# Patient Record
Sex: FEMALE | Race: WHITE
[De-identification: names, ages, dates, MRNs, and addresses within clinical notes are randomized per-mention and may not be internally consistent; named-entity substitution may affect disease eponyms.]

---

## 2017-01-12 ENCOUNTER — HOSPITAL ENCOUNTER (EMERGENCY)
Dept: HOSPITAL 25 - UCEAST | Age: 52
Discharge: HOME | End: 2017-01-12
Payer: COMMERCIAL

## 2017-01-12 VITALS — DIASTOLIC BLOOD PRESSURE: 73 MMHG | SYSTOLIC BLOOD PRESSURE: 133 MMHG

## 2017-01-12 DIAGNOSIS — F12.90: ICD-10-CM
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
## 2017-01-12 DIAGNOSIS — S06.0X0A: Primary | ICD-10-CM
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
## 2017-01-12 PROCEDURE — 99211 OFF/OP EST MAY X REQ PHY/QHP: CPT

## 2017-01-12 PROCEDURE — G0463 HOSPITAL OUTPT CLINIC VISIT: HCPCS

## 2017-01-12 PROCEDURE — 70450 CT HEAD/BRAIN W/O DYE: CPT

## 2017-01-12 NOTE — UC
Head Injury HPI





- HPI Summary


HPI Summary: 





52 yo female was skating 4 days ago fell and striking her occiput





No LOC





felt pretty good initially





no with mild HA, very low energy, trouble thinking clearly ()





no neck pain





no n/v/d





some disequilibrium





no hx concussion














- History Of Current Complaint


Chief Complaint: UCHeadInjury


Stated Complaint: HEAD INJURY


Time Seen by Provider: 01/12/17 15:07


Hx Obtained From: Patient


Hx Last Menstrual Period: irreg now  6 weeks ago?


Onset/Duration: Gradual Onset


Severity Currently: Mild


Severity Initially: Mild


Pain Intensity: 3


Pain Scale Used: 0-10 Numeric


Character: Dull


Aggravating Factor(s): Nothing


Alleviating Factor(s): Nothing


Associated Signs And Symptoms: Negative: LOC (Time In Secs./Mins/Hrs), LOC 

Duration Unknown, Confusion, Memory Loss, Seizure, Epistaxis, Dental 

Malocclusion, Neck Pain, Nausea





- Allergies/Home Medications


Allergies/Adverse Reactions: 


 Allergies











Allergy/AdvReac Type Severity Reaction Status Date / Time


 


Dust Allergy  Congestion Uncoded 01/12/17 15:02














PMH/Surg Hx/FS Hx/Imm Hx


Previously Healthy: Yes


Endocrine History Of: 


   Denies: Diabetes - gestational diabetes, Thyroid Disease


Cardiovascular History Of: 


   Denies: Cardiac Disorders, Hypertension


Respiratory History Of: 


   Denies: COPD, Asthma


GI/ History Of: 


   Denies: Ulcer


Cancer History Of: 


   Denies: Breast Cancer





- Surgical History


Surgical History: Yes


Surgery Procedure, Year, and Place: TUBAL LIGATION.  csection x2.  vascular 

surgery bilat legs-varicose veins





- Family History


Known Family History: Positive: Cardiac Disease - father, Hypertension - father

, Diabetes - grnad mother





- Social History


Alcohol Use: Occasionally


Substance Use Type: Marijuana


Smoking Status (MU): Never Smoked Tobacco





Review of Systems


Constitutional: Fatigue


Skin: Negative


Eyes: Negative


ENT: Negative


Respiratory: Negative


Cardiovascular: Negative


Gastrointestinal: Negative


Genitourinary: Negative


Motor: Negative


Neurovascular: Negative


Musculoskeletal: Negative


Neurological: Negative


Psychological: Negative


All Other Systems Reviewed And Are Negative: Yes





Physical Exam


Triage Information Reviewed: Yes


Appearance: Well-Appearing, No Pain Distress, Well-Nourished


Vital Signs: 


 Initial Vital Signs











Temp  98.9 F   01/12/17 14:59


 


Pulse  78   01/12/17 14:59


 


Resp  16   01/12/17 14:59


 


BP  133/73   01/12/17 14:59


 


Pulse Ox  100   01/12/17 14:59











Vital Signs Reviewed: Yes


Eyes: Positive: Conjunctiva Clear, Other: - fundi benign


ENT: Positive: Hearing grossly normal, Pharynx normal, TMs normal.  Negative: 

Pharyngeal erythema, Nasal congestion, Nasal drainage


Neck: Positive: Supple, Nontender, No Lymphadenopathy


Respiratory: Positive: Lungs clear, Normal breath sounds, No respiratory 

distress


Cardiovascular: Positive: RRR, No Murmur


Musculoskeletal: Positive: ROM Intact, No Edema


Neurological: Positive: Alert, Muscle Tone Normal, Other: - GCS15/15


Psychological Exam: Normal


Skin Exam: Normal





Head Injury Course/Dx





- Differential Dx/Diagnosis


Provider Diagnoses: concussion





Discharge





- Discharge Plan


Condition: Stable


Disposition: HOME


Patient Education Materials:  Concussion (ED), Post Concussion Syndrome (ED)


Referrals: 


Haley Osorio MD [Primary Care Provider] - 5 Days


Additional Instructions: 


rest (both mental and physical)

## 2017-01-12 NOTE — RAD
HISTORY: Head injury, trauma



COMPARISONS: July 15, 2014



TECHNIQUE: Multiple contiguous axial CT scans were obtained of the head without 

intravenous contrast. 



FINDINGS: 

HEMORRHAGE/INFARCT: There is no hemorrhage or acute infarct.

MASSES/SHIFT: There is no mass or shift.



EXTRA-AXIAL SPACES: There are no extra-axial fluid collections.

SULCI AND VENTRICLES: The sulci and ventricles are normal in size and position for the

patient's stated age.



CEREBRUM: There are no focal parenchymal abnormalities.

BRAINSTEM: There are no focal parenchymal abnormalities.

CEREBELLUM: There are no focal parenchymal abnormalities.



VESSELS: The vessels are grossly normal.

PARANASAL SINUSES: The paranasal sinuses are clear.

ORBITS: The orbits are unremarkable.

BONES AND SOFT TISSUE: No bone or soft tissue abnormalities are noted.



OTHER: None



IMPRESSION: 

NO ACUTE INTRACRANIAL PATHOLOGY.

## 2019-03-08 ENCOUNTER — HOSPITAL ENCOUNTER (EMERGENCY)
Dept: HOSPITAL 25 - UCEAST | Age: 54
Discharge: HOME | End: 2019-03-08
Payer: COMMERCIAL

## 2019-03-08 VITALS — DIASTOLIC BLOOD PRESSURE: 76 MMHG | SYSTOLIC BLOOD PRESSURE: 130 MMHG

## 2019-03-08 DIAGNOSIS — Z87.891: ICD-10-CM

## 2019-03-08 DIAGNOSIS — Z91.09: ICD-10-CM

## 2019-03-08 DIAGNOSIS — J40: Primary | ICD-10-CM

## 2019-03-08 DIAGNOSIS — R06.2: ICD-10-CM

## 2019-03-08 PROCEDURE — G0463 HOSPITAL OUTPT CLINIC VISIT: HCPCS

## 2019-03-08 PROCEDURE — 99212 OFFICE O/P EST SF 10 MIN: CPT

## 2019-03-08 NOTE — UC
General HPI





- HPI Summary


HPI Summary: 





Pleasant 55 yo female c/o several days chest congestion head congestion, feels 

wheezing in chest, stefan when lying down.  Fever?  no chills.  Symptoms getting 

worse, not better.  No sob perse, but is aware of her breathing.  No GI issues.

  Some head congestion.  








- History of Current Complaint


Chief Complaint: UCGeneralIllness


Stated Complaint: CONGESTED


Time Seen by Provider: 03/08/19 18:45


Hx Obtained From: Patient


Hx Last Menstrual Period: na


Pain Intensity: 6





- Allergy/Home Medications


Allergies/Adverse Reactions: 


 Allergies











Allergy/AdvReac Type Severity Reaction Status Date / Time


 


Dust Allergy  Congestion Uncoded 03/08/19 17:11











Home Medications: 


 Home Medications





Dextroamphetamine/Amphetamine [Adderall Xr 10 mg Capsule] 10 mg PO DAILY WITH 

MEAL 03/08/19 [History Confirmed 03/08/19]











PMH/Surg Hx/FS Hx/Imm Hx


Previously Healthy: No - see below





- Surgical History


Surgical History: Yes


Surgery Procedure, Year, and Place: TUBAL LIGATION.  csection x2.  vascular 

surgery bilat legs-varicose veins





- Family History


Known Family History: Positive: Cardiac Disease - father, Hypertension - father

, Diabetes - grnad mother





- Social History


Alcohol Use: Occasionally


Substance Use Type: Marijuana


Smoking Status (MU): Former Smoker





Review of Systems


All Other Systems Reviewed And Are Negative: Yes


Constitutional: Positive: Other - see hpi


Skin: Positive: Other - see hpi


Eyes: Positive: Other - see hpi


ENT: Positive: Other - see hpi


Respiratory: Positive: Other - see hpi


Cardiovascular: Positive: Other - see hpi


Gastrointestinal: Positive: Negative


Genitourinary: Positive: Negative


Motor: Positive: Negative


Neurovascular: Positive: Negative


Musculoskeletal: Positive: Negative


Neurological: Positive: Negative


Psychological: Positive: Negative


Is Patient Immunocompromised?: No





Physical Exam


Triage Information Reviewed: Yes


Appearance: Well-Nourished - sitting up, conversing in full sentances.  Looks 

tired but nad.


Vital Signs: 


 Initial Vital Signs











Temp  98.9 F   03/08/19 17:07


 


Pulse  83   03/08/19 17:07


 


Resp  18   03/08/19 17:07


 


BP  133/80   03/08/19 17:07


 


Pulse Ox  100   03/08/19 17:07











Vital Signs Reviewed: Yes


Eye Exam: Normal


ENT: Positive: Pharyngeal erythema - mild post pharyng redness, c/w cough, TM 

dull


Neck exam: Normal


Neck: Positive: Supple, Nontender


Respiratory Exam: Other - + scattered rhonchi, mild exp wheeze.  Adventitious 

sounds more prominent  R chest.  BS full bilat.


Respiratory: Positive: No respiratory distress, No accessory muscle use


Cardiovascular Exam: Normal


Cardiovascular: Positive: RRR, Pulses Normal, Brisk Capillary Refill


Abdominal Exam: Normal


Abdomen Description: Positive: Nontender


Musculoskeletal Exam: Normal - gait steady, moves x ext's


Neurological Exam: Normal


Psychological Exam: Normal


Skin Exam: Normal - no visible or reported





Course/Dx





- Course


Course Of Treatment: Reviewed coa / tx plan.  Questions answered as posed.  She 

will consider cxr if worse or new better.  Encourage resp check pcp this week 

if possible.





- Diagnoses


Provider Diagnosis: 


 Wheezing, Bronchitis








Discharge





- Sign-Out/Discharge


Documenting (check all that apply): Patient Departure


All imaging exams completed and their final reports reviewed: No Studies





- Discharge Plan


Condition: Stable


Disposition: HOME


Prescriptions: 


Albuterol HFA INHALER* [Ventolin HFA Inhaler*] 1 - 2 puff INH Q4H PRN #1 mdi


 PRN Reason: Wheezing


Benzonatate CAP* [Tessalon 100 MG CAP*] 100 mg PO TID #30 cap


DOXYcycline CAP(*) [DOXYcycline 100MG CAP(*)] 100 mg PO BID #20 cap


Patient Education Materials:  Acute Bronchitis (ED), Wheezing (ED)


Referrals: 


No Primary Care Phys,NOPCP [Primary Care Provider] - 


Additional Instructions: 


Call your primary care physician on Monday to schedule follow up for 1-2 weeks.

  


Seek medical attention for worse or new problems in the meantime. 





Drink plenty of fluids.  





Avoid prolonged sun exposure while taking doxycycline.  








- Billing Disposition and Condition


Condition: STABLE


Disposition: Home

## 2019-03-08 NOTE — XMS REPORT
Continuity of Care Document (CCD)

 Created on:2019



Patient:Laura Finley

Sex:Female

:1965

External Reference #:[id number]





Demographics







 Address  [address]



   [place] [address]

 

 Home Phone  [phone]

 

 Mobile Phone  [phone]

 

 Email Address  [email or web address]

 

 Preferred Language  en

 

 Marital Status  Not  or 

 

 Alevism Affiliation  Unknown

 

 Race  White

 

 Ethnic Group  Not  or 









Author







 Name  Franki Pao









Support







 Name  Relationship  Address  Phone

 

 Sung Dickson  Unavailable  [address]  [phone]



     [place] [address]  









Care Team Providers







 Name  Role  Phone

 

 Pao Vora MD  Primary Care Physician  Unavailable









Payers







 Date  Identification Numbers  Payment Provider  Subscriber

 

   Policy Number: [id number]  [employer]  Sung Dickson









 Group Number: [id number]  [id number]

 

 PayID: [id number]  [place] [address]







Advance Directives







 Description

 

 No Information Available







Problems







 Date  Description  Provider  Status

 

 Onset: 2015  Atypical chest pain  Compa Hand MD, [facility]  
Active

 

 Onset: 2018  Aortic valve disorder  Compa Hand MD, [facility]  
Active







Family History







 Date  Family Member(s)  Observation  Comments

 

   General  Diabetes Type II  Paternal grandmother

 

 :  (age  Father   due to multiple  



 81 Years)    myeloma  

 

   Father  Hypertension  

 

   Father  Heart Murmur  

 

   Father  Multiple Myeloma  

 

   Father  Cancer  

 

 :  (age  Mother   due to Ra  



 69 Years)      

 

   Mother  Rheumatoid Arthritis  

 

   Mother  Uterine Cancer  

 

   Siblings  3  1/2 sisters alive and



       well 2018

 

   First Sister  Breast Cancer  

 

   Maternal Grandfather  Obesity  

 

   Maternal Grandfather  Diabetes  

 

   Maternal Grandmother  Osteoarthritis  







Social History







 Type  Date  Description  Comments

 

 Birth Sex    Unknown  

 

 Marital Status      

 

 Lives With      

 

 Lives With    Children  9 and 11; 2018

 

 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

 

 Tobacco Use  Start: Unknown End:  Former Cigarette Smoker  



   Unknown    

 

 Smoking Status  Reviewed: 19  Former Cigarette Smoker  

 

 ETOH Use    Occasionally consumes  twice per month



     alcohol  

 

 Recreational Drug Use    marijuana /  occ.  

 

 Tobacco Use  Start: Unknown End:  Patient is a former  smoked off and on



   Unknown  smoker  for 15yrs stopped



       age 30

 

 Exercise Type/Frequency    Exercises regularly  4-5hrs per week,



       body pump and



       walking







Allergies, Adverse Reactions, Alerts







 Description

 

 No Known Drug Allergies







Medications







 Medication  Date  Status  Form  Strength  Qnty  SIG  Indications  Ordering



                 Provider

 

 Fluoxetine HCL    Active  Tablets  10mg    1  by mouth    Unknown



   000          every day    

 

 Adderall  ER    Active  Tablets  10mg    1 by mouth    Unknown



   000          po daily (    



             started    



             2018)    

 

 Alprazolam    Active  Tablets  0.5mg    1/2 to one    Unknown



   000          by mouth    



             daily as    



             needed for    



             anxiety    



             (Rare use)    

 

 Valacyclovir    Active  Tablets  500mg    1 by mouth    Unknown



 HCL  000          every day    

 

 Tizanidine HCL    Active  Capsules  4mg    prn for jaw    Unknown



   000          pain    

 

 Bentyl    Active  Capsules  10mg    one capsule    Unknown



   000          two to    



             three times    



             a day as    



             needed for    



             cramping    

 

 Omega III    Active  Capsules  1000mg        Unknown



 Epa+Dha  000              

 

 B-Centered    Active        Take 2    Unknown



 Supplement  000          tablets by    



             mouth    



             daily.    

 

 Zinc   5MG    Active            Unknown



   000              

 

 Ferrous Sulfate    Active  Tablets  27mg    Take two    Unknown



   000          tablets by    



             mouth    



             daily.    

 

 Melatonin    Active  Capsules  10mg    1 tab by    Unknown



   000          mouth at    



             bedtime as    



             needed for    



             insomnia    

 

                 

 

 Alprazolam    Hx  Tablets  0.25mg    as needed    Unknown



   000 -              



                 



   018              







Immunizations







 Description

 

 No Information Available







Vital Signs







 Date  Vital  Result  Comment

 

 2019  8:19am  Height  68.3 inches  5'8.30"









 Weight  162.50 lb  

 

 Heart Rate  92 /min  

 

 BP Systolic  111 mmHg  

 

 BP Diastolic  78 mmHg  

 

 Body Temperature  97.3 F  

 

 O2 % BldC Oximetry  97 %  

 

 BMI (Body Mass Index)  24.5 kg/m2  









 2018  2:19pm  Height  69 inches  5'9"









 Weight  163.50 lb  

 

 Heart Rate  88 /min  

 

 BP Systolic  142 mmHg  left arm

 

 BP Diastolic  80 mmHg  left arm

 

 BP Systolic Sitting  114 mmHg  la repeat sitting

 

 BP Diastolic Sitting  60 mmHg  la repeat sitting

 

 BMI (Body Mass Index)  24.1 kg/m2  

 

 Ejection Fraction  55-60%  2018 Echocardiogram









 2018  8:37am  Height  69 inches  5'9"









 Weight  159.12 lb  with out shoes

 

 Heart Rate  70 /min  

 

 BP Systolic Sitting  138 mmHg  Lue reg cuff

 

 BP Diastolic Sitting  80 mmHg  Lue reg cuff

 

 BP Systolic Standing  124 mmHg  Lue reg cuff

 

 BP Diastolic Standing  80 mmHg  Lue reg cuff

 

 Respiratory Rate  17 /min  

 

 BMI (Body Mass Index)  23.5 kg/m2  

 

 Ejection Fraction  55-60%  date 18 ECHO









 2016  8:14am  Height  69 inches  5'9"









 Weight  161.00 lb  

 

 Heart Rate  80 /min  92

 

 BP Systolic Sitting  122 mmHg  right arm, reg cuff

 

 BP Diastolic Sitting  72 mmHg  right arm, reg cuff

 

 BP Systolic Standing  108 mmHg  right arm, reg cuff

 

 BP Diastolic Standing  72 mmHg  right arm, reg cuff

 

 BMI (Body Mass Index)  23.8 kg/m2  

 

 Ejection Fraction  55-60%  1/15/16









 2015  1:05pm  Height  69 inches  5'9"









 Weight  163.00 lb  

 

 Heart Rate  80 /min  88

 

 BP Systolic  112 mmHg  left arm, reg cuff

 

 BP Diastolic  72 mmHg  left arm, reg cuff

 

 BP Systolic Sitting  116 mmHg  right arm, reg cuff

 

 BP Diastolic Sitting  80 mmHg  right arm, reg cuff

 

 BP Systolic Standing  110 mmHg  right arm, reg cuff

 

 BP Diastolic Standing  80 mmHg  right arm, reg cuff

 

 Respiratory Rate  20 /min  

 

 BMI (Body Mass Index)  24.1 kg/m2  







Results







 Description

 

 No Information Available







Procedures







 Date  Code  Description  Status

 

 2018  99469  ECHO Stress Test Incl Perf Contiuous ekg Monitoring  
Completed



     W/Phys Superv  

 

 10/08/2018  32983  ECHO Transthoracic, Real-Time 2D With Doppler And Color  
Completed



     Flow  

 

 10/08/2018  01914  ECHO Transthoracic, Real-Time 2D With Doppler And Color  
Completed



     Flow  

 

 2018  52717  EKG Tracing & Interpretation  Completed

 

 2018  81209063  Mammogram  Completed

 

 2018  04254  EKG Tracing & Interpretation  Completed

 

 2018  88493  ECHO Transthorasic Realtime 2D W Doppler & Color Flow  
Completed



     Hosp  

 

 10/10/2017  [id number]  Colonoscopy  Completed

 

 01/15/2016  35414  ECHO Transthorasic Realtime 2D W Doppler & Color Flow  
Completed



     Hosp  

 

 01/15/2016  23112  ECHO Transthorasic Realtime 2D W Doppler & Color Flow  
Completed



     Hosp  

 

 2015  22384  EKG Tracing & Interpretation  Completed







Encounters







 Type  Date  Location  Provider  Dx  Diagnosis

 

 Office Visit  2018  [facility]  Jadiel VALLES  I35.1  Nonrheumatic 
aortic



   2:20p    CRISTÓBAL Sorensen    (valve)



           insufficiency









 R07.9  Chest pain, unspecified

 

 R06.00  Dyspnea, unspecified









 Office Visit  2018  8:40a  [facility]  Compa HUIZAR.  I35.1  
Nonrheumatic aortic



     [facility]  MD Yazan,    (valve)



       [facility]    insufficiency

 

 Office Visit  2016  8:20a  [facility]  Compa BRAVO  I35.1  
Nonrheumatic aortic



     [facility]  MD Yazan,    (valve)



       FAC, [facility]    insufficiency

 

 Office Visit  2015  1:00p  [facility]  Compa BRAVO  R07.9  Chest 
pain,



     [facility]  MD Yazan,    unspecified



       [facility]    









 R00.2  Palpitations

 

 I35.1  Nonrheumatic aortic (valve) insufficiency







Plan of Treatment

Future Appointment(s):2019  9:30 am - KALEIGH Alejandro at [facility]2019 - Latosha Soria, PAN95.9 Unspecified 
menopausal and perimenopausal disorderNew Labs:Estradiol, Ordered: 19Dhea 
Sulfate, Ordered: 19Progesterone, Ordered: 19Vitamin D Total 25(Oh)
, Ordered: 19Testosterone Free &amp; Total, Ordered: 19Follow up:1 
monthRecommendations:I will call in a script for hormones after I see your lab 
work. sign up for rocduiU91.1 Irritable bowel syndrome with constipationNew Labs
:Zinc Serum, Ordered: 19Copper, Serum, Ordered: 19Vitamin B12 And 
Folate Serum, Ordered: 19Recommendations:please do a 3 day food diary off 
probiotics for 2 weeks then do the CDSA test see me in 1 kuvdbL29.0 Attention-
deficit hyperactivity disorder, predominantly inatR68.82 Decreased gdwtdvA45.9 
Hyperglycemia, unspecifiedNew Labs:Hemoglobin A1c (Glyco HGB), Ordered: E78.5 Hyperlipidemia, unspecifiedNew Labs:Lipid Profile (Trig/Chol/HDL), 
Ordered: 19Homocysteine, Ordered: 19CRP High Sensitivity, Ordered: 
19